# Patient Record
Sex: FEMALE | Race: WHITE | Employment: FULL TIME | URBAN - METROPOLITAN AREA
[De-identification: names, ages, dates, MRNs, and addresses within clinical notes are randomized per-mention and may not be internally consistent; named-entity substitution may affect disease eponyms.]

---

## 2017-08-15 ENCOUNTER — HOSPITAL ENCOUNTER (OUTPATIENT)
Dept: MAMMOGRAPHY | Age: 57
Discharge: HOME | End: 2017-08-15
Attending: SPECIALIST
Payer: COMMERCIAL

## 2017-08-15 DIAGNOSIS — Z12.31 VISIT FOR SCREENING MAMMOGRAM: ICD-10-CM

## 2017-08-15 PROCEDURE — 77067 SCR MAMMO BI INCL CAD: CPT

## 2018-09-19 ENCOUNTER — HOSPITAL ENCOUNTER (OUTPATIENT)
Dept: MAMMOGRAPHY | Age: 58
Discharge: HOME | End: 2018-09-19
Attending: SPECIALIST
Payer: COMMERCIAL

## 2018-09-19 DIAGNOSIS — Z12.39 SCREENING BREAST EXAMINATION: ICD-10-CM

## 2018-09-19 PROCEDURE — 77067 SCR MAMMO BI INCL CAD: CPT

## 2019-09-20 ENCOUNTER — HOSPITAL ENCOUNTER (OUTPATIENT)
Dept: MAMMOGRAPHY | Age: 59
Discharge: HOME | End: 2019-09-20
Attending: SPECIALIST
Payer: COMMERCIAL

## 2019-09-20 DIAGNOSIS — Z12.39 BREAST SCREENING, UNSPECIFIED: ICD-10-CM

## 2019-09-20 PROCEDURE — 77067 SCR MAMMO BI INCL CAD: CPT

## 2020-07-02 ENCOUNTER — HOSPITAL ENCOUNTER (OUTPATIENT)
Dept: PREADMISSION TESTING | Age: 60
Discharge: HOME | End: 2020-07-02
Attending: PLASTIC SURGERY
Payer: SELF-PAY

## 2020-07-02 VITALS
SYSTOLIC BLOOD PRESSURE: 137 MMHG | RESPIRATION RATE: 16 BRPM | DIASTOLIC BLOOD PRESSURE: 71 MMHG | HEART RATE: 60 BPM | TEMPERATURE: 98.3 F | OXYGEN SATURATION: 99 % | HEIGHT: 63 IN | WEIGHT: 148.59 LBS | BODY MASS INDEX: 26.33 KG/M2

## 2020-07-02 LAB
ANION GAP SERPL CALC-SCNC: 4 MMOL/L (ref 5–15)
BUN SERPL-MCNC: 8 MG/DL (ref 6–20)
BUN/CREAT SERPL: 10 (ref 12–20)
CALCIUM SERPL-MCNC: 8.3 MG/DL (ref 8.5–10.1)
CHLORIDE SERPL-SCNC: 108 MMOL/L (ref 97–108)
CO2 SERPL-SCNC: 28 MMOL/L (ref 21–32)
CREAT SERPL-MCNC: 0.78 MG/DL (ref 0.55–1.02)
ERYTHROCYTE [DISTWIDTH] IN BLOOD BY AUTOMATED COUNT: 12.7 % (ref 11.5–14.5)
GLUCOSE SERPL-MCNC: 94 MG/DL (ref 65–100)
HCT VFR BLD AUTO: 41 % (ref 35–47)
HGB BLD-MCNC: 13.2 G/DL (ref 11.5–16)
MCH RBC QN AUTO: 30.6 PG (ref 26–34)
MCHC RBC AUTO-ENTMCNC: 32.2 G/DL (ref 30–36.5)
MCV RBC AUTO: 95.1 FL (ref 80–99)
NRBC # BLD: 0 K/UL (ref 0–0.01)
NRBC BLD-RTO: 0 PER 100 WBC
PLATELET # BLD AUTO: 222 K/UL (ref 150–400)
PMV BLD AUTO: 12.3 FL (ref 8.9–12.9)
POTASSIUM SERPL-SCNC: 3.9 MMOL/L (ref 3.5–5.1)
RBC # BLD AUTO: 4.31 M/UL (ref 3.8–5.2)
SODIUM SERPL-SCNC: 140 MMOL/L (ref 136–145)
WBC # BLD AUTO: 8.9 K/UL (ref 3.6–11)

## 2020-07-02 PROCEDURE — 93005 ELECTROCARDIOGRAM TRACING: CPT

## 2020-07-02 PROCEDURE — 36415 COLL VENOUS BLD VENIPUNCTURE: CPT

## 2020-07-02 PROCEDURE — 80048 BASIC METABOLIC PNL TOTAL CA: CPT

## 2020-07-02 PROCEDURE — 85027 COMPLETE CBC AUTOMATED: CPT

## 2020-07-02 RX ORDER — ACETAMINOPHEN 500 MG
500 TABLET ORAL
COMMUNITY

## 2020-07-02 NOTE — PERIOP NOTES
[facility]  Preoperative Instructions        Surgery Date 07/10/2020   Time of Arrival 0545am  Contact# [phone]    1. On the day of your surgery, please report to the Surgical Services Registration Desk and sign in at your designated time. The Surgery Center is located to the right of the Emergency Room. 2. You must have someone with you to drive you home. You should not drive a car for 24 hours following surgery. Please make arrangements for a friend or family member to stay with you for the first 24 hours after your surgery. 3. Do not have anything to eat or drink (including water, gum, mints, coffee, juice) after midnight 7/9/2020 . ? This may not apply to medications prescribed by your physician. ?(Please note below the special instructions with medications to take the morning of your procedure.)    4. We recommend you do not drink any alcoholic beverages for 24 hours before and after your surgery. 5. Contact your surgeons office for instructions on the following medications: non-steroidal anti-inflammatory drugs (i.e. Advil, Aleve), vitamins, and supplements. (Some surgeons will want you to stop these medications prior to surgery and others may allow you to take them)  **If you are currently taking Plavix, Coumadin, Aspirin and/or other blood-thinning agents, contact your surgeon for instructions. ** Your surgeon will partner with the physician prescribing these medications to determine if it is safe to stop or if you need to continue taking. Please do not stop taking these medications without instructions from your surgeon    6. Wear comfortable clothes. Wear glasses instead of contacts. Do not bring any money or jewelry. Please bring picture ID, insurance card, and any prearranged co-payment or hospital payment. Do not wear make-up, particularly mascara the morning of your surgery. Do not wear nail polish, particularly if you are having foot /hand surgery.   Wear your hair loose or down, no ponytails, buns, isha pins or clips. All body piercings must be removed. Please shower with antibacterial soap for three consecutive days before and on the morning of surgery, but do not apply any lotions, powders or deodorants after the shower on the day of surgery. Please use a fresh towels after each shower. Please sleep in clean clothes and change bed linens the night before surgery. Please do not shave for 48 hours prior to surgery. Shaving of the face is acceptable. 7. You should understand that if you do not follow these instructions your surgery may be cancelled. If your physical condition changes (I.e. fever, cold or flu) please contact your surgeon as soon as possible. 8. It is important that you be on time. If a situation occurs where you may be late, please call [phone] (OR Holding Area). 9. If you have any questions and or problems, please call [phone] (Pre-admission Testing). 10. Your surgery time may be subject to change. You will receive a phone call the evening prior if your time changes. 11.  If having outpatient surgery, you must have someone to drive you here, stay with you during the duration of your stay, and to drive you home at time of discharge. Special Instructions: Follow all instructions given to you by your doctor. Covid Testing 7/6/2020 @1030am - we recommend you self quarantine from time of testing til day of surgery. TAKE ALL MEDICATIONS DAY OF SURGERY EXCEPT: none      I understand a pre-operative phone call will be made to verify my surgery time. In the event that I am not available, I give permission for a message to be left on my answering service and/or with another person?   yes         ___________________      __________   _________    (Signature of Patient)             (Witness)                (Date and Time)

## 2020-07-02 NOTE — PERIOP NOTES
PAT appointment with patient - all teaching and instructions reviewed with no further questions.     Covid Testing 7/6/2020 @ 1030am - recommend you quarantine from time of testing til DOS

## 2020-07-03 LAB
ATRIAL RATE: 62 BPM
CALCULATED P AXIS, ECG09: -18 DEGREES
CALCULATED R AXIS, ECG10: -11 DEGREES
CALCULATED T AXIS, ECG11: 32 DEGREES
DIAGNOSIS, 93000: NORMAL
P-R INTERVAL, ECG05: 134 MS
Q-T INTERVAL, ECG07: 434 MS
QRS DURATION, ECG06: 86 MS
QTC CALCULATION (BEZET), ECG08: 440 MS
VENTRICULAR RATE, ECG03: 62 BPM

## 2020-07-06 ENCOUNTER — HOSPITAL ENCOUNTER (OUTPATIENT)
Dept: PREADMISSION TESTING | Age: 60
Discharge: HOME | End: 2020-07-06
Payer: SELF-PAY

## 2020-07-06 PROCEDURE — 87635 SARS-COV-2 COVID-19 AMP PRB: CPT

## 2020-07-07 LAB
SARS-COV-2, COV2NT: NOT DETECTED
SOURCE, COVRS: NORMAL
SPECIMEN SOURCE, FCOV2M: NORMAL

## 2020-07-09 ENCOUNTER — ANESTHESIA EVENT (OUTPATIENT)
Dept: SURGERY | Age: 60
End: 2020-07-09
Payer: SELF-PAY

## 2020-07-09 NOTE — ANESTHESIA PREPROCEDURE EVALUATION
Relevant Problems   No relevant active problems       Anesthetic History   No history of anesthetic complications            Review of Systems / Medical History  Patient summary reviewed, nursing notes reviewed and pertinent labs reviewed    Pulmonary  Within defined limits                 Neuro/Psych   Within defined limits           Cardiovascular  Within defined limits                Exercise tolerance: >4 METS     GI/Hepatic/Renal         Renal disease: stones      Comments: IBS Endo/Other      Hypothyroidism       Other Findings              Physical Exam    Airway  Mallampati: II  TM Distance: 4 - 6 cm  Neck ROM: normal range of motion   Mouth opening: Normal     Cardiovascular  Regular rate and rhythm,  S1 and S2 normal,  no murmur, click, rub, or gallop  Rhythm: regular  Rate: normal         Dental    Dentition: Caps/crowns     Pulmonary  Breath sounds clear to auscultation               Abdominal  GI exam deferred       Other Findings            Anesthetic Plan    ASA: 2  Anesthesia type: general          Induction: Intravenous  Anesthetic plan and risks discussed with: Patient

## 2020-07-10 ENCOUNTER — ANESTHESIA (OUTPATIENT)
Dept: SURGERY | Age: 60
End: 2020-07-10
Payer: SELF-PAY

## 2020-07-10 ENCOUNTER — HOSPITAL ENCOUNTER (OUTPATIENT)
Age: 60
Setting detail: OUTPATIENT SURGERY
Discharge: HOME | End: 2020-07-10
Attending: PLASTIC SURGERY | Admitting: PLASTIC SURGERY
Payer: SELF-PAY

## 2020-07-10 VITALS
OXYGEN SATURATION: 94 % | HEIGHT: 63 IN | RESPIRATION RATE: 14 BRPM | DIASTOLIC BLOOD PRESSURE: 58 MMHG | BODY MASS INDEX: 26.17 KG/M2 | WEIGHT: 147.71 LBS | SYSTOLIC BLOOD PRESSURE: 115 MMHG | TEMPERATURE: 98 F | HEART RATE: 96 BPM

## 2020-07-10 PROCEDURE — 77030031139 HC SUT VCRL2 J&J -A: Performed by: PLASTIC SURGERY

## 2020-07-10 PROCEDURE — 77030008684 HC TU ET CUF COVD -B: Performed by: ANESTHESIOLOGY

## 2020-07-10 PROCEDURE — 77030018836 HC SOL IRR NACL ICUM -A: Performed by: PLASTIC SURGERY

## 2020-07-10 PROCEDURE — 77030013674 HC FIL EXPND TISS ALGN -A: Performed by: PLASTIC SURGERY

## 2020-07-10 PROCEDURE — 74011000250 HC RX REV CODE- 250

## 2020-07-10 PROCEDURE — 76060000043 HC ANESTHESIA 6 TO 6.5 HR: Performed by: PLASTIC SURGERY

## 2020-07-10 PROCEDURE — 76010000140 HC OR TIME 6 TO 6.5 HR: Performed by: PLASTIC SURGERY

## 2020-07-10 PROCEDURE — 77030011640 HC PAD GRND REM COVD -A: Performed by: PLASTIC SURGERY

## 2020-07-10 PROCEDURE — 76210000020 HC REC RM PH II FIRST 0.5 HR: Performed by: PLASTIC SURGERY

## 2020-07-10 PROCEDURE — 77030008538 HC TBNG LIPO SUC WELL -B: Performed by: PLASTIC SURGERY

## 2020-07-10 PROCEDURE — 74011250636 HC RX REV CODE- 250/636: Performed by: ANESTHESIOLOGY

## 2020-07-10 PROCEDURE — 77030026438 HC STYL ET INTUB CARD -A: Performed by: ANESTHESIOLOGY

## 2020-07-10 PROCEDURE — 76210000006 HC OR PH I REC 0.5 TO 1 HR: Performed by: PLASTIC SURGERY

## 2020-07-10 PROCEDURE — 77030020061 HC IV BLD WRMR ADMIN SET 3M -B: Performed by: ANESTHESIOLOGY

## 2020-07-10 PROCEDURE — 77030008534 HC TBNG LIPOSUC BYRO -B: Performed by: PLASTIC SURGERY

## 2020-07-10 PROCEDURE — 74011250636 HC RX REV CODE- 250/636: Performed by: PLASTIC SURGERY

## 2020-07-10 PROCEDURE — 74011000250 HC RX REV CODE- 250: Performed by: PLASTIC SURGERY

## 2020-07-10 PROCEDURE — 77030013079 HC BLNKT BAIR HGGR 3M -A: Performed by: ANESTHESIOLOGY

## 2020-07-10 PROCEDURE — 77030038692 HC WND DEB SYS IRMX -B: Performed by: PLASTIC SURGERY

## 2020-07-10 PROCEDURE — 77030010507 HC ADH SKN DERMBND J&J -B: Performed by: PLASTIC SURGERY

## 2020-07-10 PROCEDURE — 77030011825 HC SUPP SURG PSTOP S2SG -B: Performed by: PLASTIC SURGERY

## 2020-07-10 PROCEDURE — 77030008459 HC STPLR SKN COOP -B: Performed by: PLASTIC SURGERY

## 2020-07-10 PROCEDURE — 77030012890

## 2020-07-10 PROCEDURE — 74011250636 HC RX REV CODE- 250/636

## 2020-07-10 PROCEDURE — 77030013629 HC ELECTRD NDL STRY -B: Performed by: PLASTIC SURGERY

## 2020-07-10 PROCEDURE — 77030020255 HC SOL INJ LR 1000ML BG: Performed by: PLASTIC SURGERY

## 2020-07-10 PROCEDURE — 77030005304: Performed by: PLASTIC SURGERY

## 2020-07-10 PROCEDURE — 74011250637 HC RX REV CODE- 250/637

## 2020-07-10 PROCEDURE — 77030002966 HC SUT PDS J&J -A: Performed by: PLASTIC SURGERY

## 2020-07-10 PROCEDURE — 74011000272 HC RX REV CODE- 272: Performed by: PLASTIC SURGERY

## 2020-07-10 PROCEDURE — 77030002974 HC SUT PLN J&J -A: Performed by: PLASTIC SURGERY

## 2020-07-10 PROCEDURE — P9045 ALBUMIN (HUMAN), 5%, 250 ML: HCPCS

## 2020-07-10 PROCEDURE — 77030002986 HC SUT PROL J&J -A: Performed by: PLASTIC SURGERY

## 2020-07-10 PROCEDURE — 77030040922 HC BLNKT HYPOTHRM STRY -A

## 2020-07-10 PROCEDURE — 77030002933 HC SUT MCRYL J&J -A: Performed by: PLASTIC SURGERY

## 2020-07-10 PROCEDURE — 77030008462 HC STPLR SKN PROX J&J -A: Performed by: PLASTIC SURGERY

## 2020-07-10 PROCEDURE — 74011250637 HC RX REV CODE- 250/637: Performed by: PLASTIC SURGERY

## 2020-07-10 DEVICE — SMOOTH ROUND HIGH PROFILE GEL-FILLED BREAST IMPLANT, 800CC  SMOOTH ROUND SILICONE
Type: IMPLANTABLE DEVICE | Site: BREAST | Status: FUNCTIONAL
Brand: MENTOR MEMORYGEL BREAST IMPLANT

## 2020-07-10 RX ORDER — SODIUM CHLORIDE 0.9 % (FLUSH) 0.9 %
5-40 SYRINGE (ML) INJECTION EVERY 8 HOURS
Status: CANCELLED | OUTPATIENT
Start: 2020-07-10

## 2020-07-10 RX ORDER — SODIUM CHLORIDE, SODIUM LACTATE, POTASSIUM CHLORIDE, CALCIUM CHLORIDE 600; 310; 30; 20 MG/100ML; MG/100ML; MG/100ML; MG/100ML
75 INJECTION, SOLUTION INTRAVENOUS CONTINUOUS
Status: CANCELLED | OUTPATIENT
Start: 2020-07-10 | End: 2020-07-11

## 2020-07-10 RX ORDER — FENTANYL CITRATE 50 UG/ML
INJECTION, SOLUTION INTRAMUSCULAR; INTRAVENOUS AS NEEDED
Status: DISCONTINUED | OUTPATIENT
Start: 2020-07-10 | End: 2020-07-10 | Stop reason: HOSPADM

## 2020-07-10 RX ORDER — GLYCOPYRROLATE 0.2 MG/ML
INJECTION INTRAMUSCULAR; INTRAVENOUS AS NEEDED
Status: DISCONTINUED | OUTPATIENT
Start: 2020-07-10 | End: 2020-07-10 | Stop reason: HOSPADM

## 2020-07-10 RX ORDER — FENTANYL CITRATE 50 UG/ML
25 INJECTION, SOLUTION INTRAMUSCULAR; INTRAVENOUS
Status: DISCONTINUED | OUTPATIENT
Start: 2020-07-10 | End: 2020-07-10 | Stop reason: HOSPADM

## 2020-07-10 RX ORDER — OXYCODONE AND ACETAMINOPHEN 5; 325 MG/1; MG/1
1 TABLET ORAL AS NEEDED
Status: DISCONTINUED | OUTPATIENT
Start: 2020-07-10 | End: 2020-07-10 | Stop reason: HOSPADM

## 2020-07-10 RX ORDER — MIDAZOLAM HYDROCHLORIDE 1 MG/ML
0.5 INJECTION, SOLUTION INTRAMUSCULAR; INTRAVENOUS
Status: DISCONTINUED | OUTPATIENT
Start: 2020-07-10 | End: 2020-07-10 | Stop reason: HOSPADM

## 2020-07-10 RX ORDER — ONDANSETRON 2 MG/ML
4 INJECTION INTRAMUSCULAR; INTRAVENOUS AS NEEDED
Status: DISCONTINUED | OUTPATIENT
Start: 2020-07-10 | End: 2020-07-10 | Stop reason: HOSPADM

## 2020-07-10 RX ORDER — SODIUM CHLORIDE 0.9 % (FLUSH) 0.9 %
5-40 SYRINGE (ML) INJECTION AS NEEDED
Status: CANCELLED | OUTPATIENT
Start: 2020-07-10

## 2020-07-10 RX ORDER — PHENYLEPHRINE HCL IN 0.9% NACL 0.4MG/10ML
SYRINGE (ML) INTRAVENOUS AS NEEDED
Status: DISCONTINUED | OUTPATIENT
Start: 2020-07-10 | End: 2020-07-10 | Stop reason: HOSPADM

## 2020-07-10 RX ORDER — LIDOCAINE HYDROCHLORIDE 20 MG/ML
INJECTION, SOLUTION EPIDURAL; INFILTRATION; INTRACAUDAL; PERINEURAL AS NEEDED
Status: DISCONTINUED | OUTPATIENT
Start: 2020-07-10 | End: 2020-07-10 | Stop reason: HOSPADM

## 2020-07-10 RX ORDER — OXYCODONE AND ACETAMINOPHEN 5; 325 MG/1; MG/1
TABLET ORAL
Status: COMPLETED
Start: 2020-07-10 | End: 2020-07-10

## 2020-07-10 RX ORDER — ROCURONIUM BROMIDE 10 MG/ML
INJECTION, SOLUTION INTRAVENOUS AS NEEDED
Status: DISCONTINUED | OUTPATIENT
Start: 2020-07-10 | End: 2020-07-10 | Stop reason: HOSPADM

## 2020-07-10 RX ORDER — ONDANSETRON 2 MG/ML
INJECTION INTRAMUSCULAR; INTRAVENOUS AS NEEDED
Status: DISCONTINUED | OUTPATIENT
Start: 2020-07-10 | End: 2020-07-10 | Stop reason: HOSPADM

## 2020-07-10 RX ORDER — SODIUM CHLORIDE 9 MG/ML
50 INJECTION, SOLUTION INTRAVENOUS CONTINUOUS
Status: CANCELLED | OUTPATIENT
Start: 2020-07-10 | End: 2020-07-11

## 2020-07-10 RX ORDER — SODIUM CHLORIDE, SODIUM LACTATE, POTASSIUM CHLORIDE, CALCIUM CHLORIDE 600; 310; 30; 20 MG/100ML; MG/100ML; MG/100ML; MG/100ML
25 INJECTION, SOLUTION INTRAVENOUS ONCE
Status: COMPLETED | OUTPATIENT
Start: 2020-07-10 | End: 2020-07-10

## 2020-07-10 RX ORDER — LIDOCAINE HYDROCHLORIDE 10 MG/ML
0.1 INJECTION, SOLUTION EPIDURAL; INFILTRATION; INTRACAUDAL; PERINEURAL AS NEEDED
Status: CANCELLED | OUTPATIENT
Start: 2020-07-10

## 2020-07-10 RX ORDER — SODIUM CHLORIDE, SODIUM LACTATE, POTASSIUM CHLORIDE, CALCIUM CHLORIDE 600; 310; 30; 20 MG/100ML; MG/100ML; MG/100ML; MG/100ML
75 INJECTION, SOLUTION INTRAVENOUS CONTINUOUS
Status: DISCONTINUED | OUTPATIENT
Start: 2020-07-10 | End: 2020-07-10 | Stop reason: HOSPADM

## 2020-07-10 RX ORDER — DIPHENHYDRAMINE HYDROCHLORIDE 50 MG/ML
12.5 INJECTION, SOLUTION INTRAMUSCULAR; INTRAVENOUS AS NEEDED
Status: DISCONTINUED | OUTPATIENT
Start: 2020-07-10 | End: 2020-07-10 | Stop reason: HOSPADM

## 2020-07-10 RX ORDER — SODIUM CHLORIDE, SODIUM LACTATE, POTASSIUM CHLORIDE, CALCIUM CHLORIDE 600; 310; 30; 20 MG/100ML; MG/100ML; MG/100ML; MG/100ML
INJECTION, SOLUTION INTRAVENOUS
Status: DISCONTINUED | OUTPATIENT
Start: 2020-07-10 | End: 2020-07-10 | Stop reason: HOSPADM

## 2020-07-10 RX ORDER — SODIUM CHLORIDE 0.9 % (FLUSH) 0.9 %
5-40 SYRINGE (ML) INJECTION EVERY 8 HOURS
Status: DISCONTINUED | OUTPATIENT
Start: 2020-07-10 | End: 2020-07-10 | Stop reason: HOSPADM

## 2020-07-10 RX ORDER — MORPHINE SULFATE 10 MG/ML
2 INJECTION, SOLUTION INTRAMUSCULAR; INTRAVENOUS
Status: DISCONTINUED | OUTPATIENT
Start: 2020-07-10 | End: 2020-07-10 | Stop reason: HOSPADM

## 2020-07-10 RX ORDER — SODIUM CHLORIDE 9 MG/ML
50 INJECTION, SOLUTION INTRAVENOUS CONTINUOUS
Status: DISCONTINUED | OUTPATIENT
Start: 2020-07-10 | End: 2020-07-10 | Stop reason: HOSPADM

## 2020-07-10 RX ORDER — HYDROMORPHONE HYDROCHLORIDE 1 MG/ML
0.2 INJECTION, SOLUTION INTRAMUSCULAR; INTRAVENOUS; SUBCUTANEOUS
Status: DISCONTINUED | OUTPATIENT
Start: 2020-07-10 | End: 2020-07-10 | Stop reason: HOSPADM

## 2020-07-10 RX ORDER — NEOSTIGMINE METHYLSULFATE 1 MG/ML
INJECTION, SOLUTION INTRAVENOUS AS NEEDED
Status: DISCONTINUED | OUTPATIENT
Start: 2020-07-10 | End: 2020-07-10 | Stop reason: HOSPADM

## 2020-07-10 RX ORDER — PROPOFOL 10 MG/ML
INJECTION, EMULSION INTRAVENOUS AS NEEDED
Status: DISCONTINUED | OUTPATIENT
Start: 2020-07-10 | End: 2020-07-10 | Stop reason: HOSPADM

## 2020-07-10 RX ORDER — EPHEDRINE SULFATE/0.9% NACL/PF 50 MG/5 ML
SYRINGE (ML) INTRAVENOUS AS NEEDED
Status: DISCONTINUED | OUTPATIENT
Start: 2020-07-10 | End: 2020-07-10 | Stop reason: HOSPADM

## 2020-07-10 RX ORDER — MIDAZOLAM HYDROCHLORIDE 1 MG/ML
1 INJECTION, SOLUTION INTRAMUSCULAR; INTRAVENOUS AS NEEDED
Status: CANCELLED | OUTPATIENT
Start: 2020-07-10

## 2020-07-10 RX ORDER — DIPHENHYDRAMINE HYDROCHLORIDE 50 MG/ML
INJECTION, SOLUTION INTRAMUSCULAR; INTRAVENOUS AS NEEDED
Status: DISCONTINUED | OUTPATIENT
Start: 2020-07-10 | End: 2020-07-10 | Stop reason: HOSPADM

## 2020-07-10 RX ORDER — SUCCINYLCHOLINE CHLORIDE 20 MG/ML
INJECTION INTRAMUSCULAR; INTRAVENOUS AS NEEDED
Status: DISCONTINUED | OUTPATIENT
Start: 2020-07-10 | End: 2020-07-10 | Stop reason: HOSPADM

## 2020-07-10 RX ORDER — MIDAZOLAM HYDROCHLORIDE 1 MG/ML
INJECTION, SOLUTION INTRAMUSCULAR; INTRAVENOUS AS NEEDED
Status: DISCONTINUED | OUTPATIENT
Start: 2020-07-10 | End: 2020-07-10 | Stop reason: HOSPADM

## 2020-07-10 RX ORDER — DEXAMETHASONE SODIUM PHOSPHATE 4 MG/ML
INJECTION, SOLUTION INTRA-ARTICULAR; INTRALESIONAL; INTRAMUSCULAR; INTRAVENOUS; SOFT TISSUE AS NEEDED
Status: DISCONTINUED | OUTPATIENT
Start: 2020-07-10 | End: 2020-07-10 | Stop reason: HOSPADM

## 2020-07-10 RX ORDER — ALBUMIN HUMAN 50 G/1000ML
SOLUTION INTRAVENOUS AS NEEDED
Status: DISCONTINUED | OUTPATIENT
Start: 2020-07-10 | End: 2020-07-10 | Stop reason: HOSPADM

## 2020-07-10 RX ORDER — SODIUM CHLORIDE 0.9 % (FLUSH) 0.9 %
5-40 SYRINGE (ML) INJECTION AS NEEDED
Status: DISCONTINUED | OUTPATIENT
Start: 2020-07-10 | End: 2020-07-10 | Stop reason: HOSPADM

## 2020-07-10 RX ORDER — FENTANYL CITRATE 50 UG/ML
50 INJECTION, SOLUTION INTRAMUSCULAR; INTRAVENOUS AS NEEDED
Status: CANCELLED | OUTPATIENT
Start: 2020-07-10

## 2020-07-10 RX ORDER — HYDROMORPHONE HYDROCHLORIDE 2 MG/ML
INJECTION, SOLUTION INTRAMUSCULAR; INTRAVENOUS; SUBCUTANEOUS AS NEEDED
Status: DISCONTINUED | OUTPATIENT
Start: 2020-07-10 | End: 2020-07-10 | Stop reason: HOSPADM

## 2020-07-10 RX ADMIN — HYDROMORPHONE HYDROCHLORIDE 0.4 MG: 2 INJECTION, SOLUTION INTRAMUSCULAR; INTRAVENOUS; SUBCUTANEOUS at 12:13

## 2020-07-10 RX ADMIN — SODIUM CHLORIDE, POTASSIUM CHLORIDE, SODIUM LACTATE AND CALCIUM CHLORIDE: 600; 310; 30; 20 INJECTION, SOLUTION INTRAVENOUS at 09:11

## 2020-07-10 RX ADMIN — Medication 10 MG: at 09:30

## 2020-07-10 RX ADMIN — DEXAMETHASONE SODIUM PHOSPHATE 8 MG: 4 INJECTION, SOLUTION INTRAMUSCULAR; INTRAVENOUS at 07:57

## 2020-07-10 RX ADMIN — ONDANSETRON HYDROCHLORIDE 4 MG: 2 INJECTION, SOLUTION INTRAMUSCULAR; INTRAVENOUS at 10:04

## 2020-07-10 RX ADMIN — SODIUM CHLORIDE, SODIUM LACTATE, POTASSIUM CHLORIDE, AND CALCIUM CHLORIDE 25 ML/HR: 600; 310; 30; 20 INJECTION, SOLUTION INTRAVENOUS at 06:39

## 2020-07-10 RX ADMIN — SUCCINYLCHOLINE CHLORIDE 140 MG: 20 INJECTION, SOLUTION INTRAMUSCULAR; INTRAVENOUS at 07:35

## 2020-07-10 RX ADMIN — Medication 10 MG: at 09:00

## 2020-07-10 RX ADMIN — Medication 20 MCG: at 12:30

## 2020-07-10 RX ADMIN — GLYCOPYRROLATE 0.4 MG: 0.2 INJECTION, SOLUTION INTRAMUSCULAR; INTRAVENOUS at 13:20

## 2020-07-10 RX ADMIN — HYDROMORPHONE HYDROCHLORIDE 0.4 MG: 2 INJECTION, SOLUTION INTRAMUSCULAR; INTRAVENOUS; SUBCUTANEOUS at 09:04

## 2020-07-10 RX ADMIN — WATER 2 G: 1 INJECTION INTRAMUSCULAR; INTRAVENOUS; SUBCUTANEOUS at 11:23

## 2020-07-10 RX ADMIN — PROPOFOL 200 MG: 10 INJECTION, EMULSION INTRAVENOUS at 07:35

## 2020-07-10 RX ADMIN — Medication 10 MG: at 10:44

## 2020-07-10 RX ADMIN — ROCURONIUM BROMIDE 40 MG: 10 INJECTION INTRAVENOUS at 07:43

## 2020-07-10 RX ADMIN — MIDAZOLAM HYDROCHLORIDE 2 MG: 1 INJECTION, SOLUTION INTRAMUSCULAR; INTRAVENOUS at 07:34

## 2020-07-10 RX ADMIN — FENTANYL CITRATE 100 MCG: 50 INJECTION, SOLUTION INTRAMUSCULAR; INTRAVENOUS at 07:35

## 2020-07-10 RX ADMIN — SODIUM CHLORIDE, SODIUM LACTATE, POTASSIUM CHLORIDE, AND CALCIUM CHLORIDE 75 ML/HR: 600; 310; 30; 20 INJECTION, SOLUTION INTRAVENOUS at 14:12

## 2020-07-10 RX ADMIN — Medication 10 MG: at 09:10

## 2020-07-10 RX ADMIN — DIPHENHYDRAMINE HYDROCHLORIDE 12.5 MG: 50 INJECTION, SOLUTION INTRAMUSCULAR; INTRAVENOUS at 09:40

## 2020-07-10 RX ADMIN — LIDOCAINE HYDROCHLORIDE 40 MG: 20 INJECTION, SOLUTION EPIDURAL; INFILTRATION; INTRACAUDAL; PERINEURAL at 07:35

## 2020-07-10 RX ADMIN — WATER 2 G: 1 INJECTION INTRAMUSCULAR; INTRAVENOUS; SUBCUTANEOUS at 07:24

## 2020-07-10 RX ADMIN — ROCURONIUM BROMIDE 10 MG: 10 INJECTION INTRAVENOUS at 07:35

## 2020-07-10 RX ADMIN — SODIUM CHLORIDE, POTASSIUM CHLORIDE, SODIUM LACTATE AND CALCIUM CHLORIDE: 600; 310; 30; 20 INJECTION, SOLUTION INTRAVENOUS at 07:23

## 2020-07-10 RX ADMIN — ALBUMIN (HUMAN) 250 ML: 2.5 SOLUTION INTRAVENOUS at 07:46

## 2020-07-10 RX ADMIN — OXYCODONE HYDROCHLORIDE AND ACETAMINOPHEN 1 TABLET: 5; 325 TABLET ORAL at 14:30

## 2020-07-10 RX ADMIN — Medication 3 AMPULE: at 06:32

## 2020-07-10 RX ADMIN — HYDROMORPHONE HYDROCHLORIDE 0.4 MG: 2 INJECTION, SOLUTION INTRAMUSCULAR; INTRAVENOUS; SUBCUTANEOUS at 10:27

## 2020-07-10 RX ADMIN — OXYCODONE AND ACETAMINOPHEN 1 TABLET: 5; 325 TABLET ORAL at 14:30

## 2020-07-10 RX ADMIN — Medication 3 MG: at 13:20

## 2020-07-10 NOTE — BRIEF OP NOTE
Brief Postoperative Note    Patient: Lilibeth Ryan  YOB: 1960  MRN: [id number]    Date of Procedure: 7/10/2020     Pre-Op Diagnosis: COSMETIC    Post-Op Diagnosis: Same as preoperative diagnosis. Procedure(s):  FACELIFT WITH FAT GRAFTING (8ml total)  BILATERAL BREAST MASTOPEXY WITH IMPLANT EXCHANGE (750ml HP gel on left, 800ml HP gel on right)  LIPOSUCTION BILATERAL LOWER EXTREMITIES (600ml total)    Surgeon(s):  Omero Perkins MD    Surgical Assistant: None    Anesthesia: General     Estimated Blood Loss (mL): less than 574     Complications: None    Specimens: * No specimens in log *     Implants:   Implant Name Type Inv.  Item Serial No.  Lot No. LRB No. Used Action   IMPL BRST RND HP GEL 750ML --  - [id number]  IMPL BRST RND HP GEL 750ML --  [id number] [place] [id number] Left 1 Implanted   IMPL BRST RND SMTH 800ML --  - [id number]  IMPL BRST RND SMTH 800ML --  [id number] [employer] [id number] Right 1 Implanted       Drains:   Drain 8 fr feeding tube used as drain Left Face (Active)       Drain 8 fr feeding tube as drain 07/10/20 Right Face (Active)       Findings: see note    Electronically Signed by Lulu Koroma MD on 7/10/2020 at 1:13 PM

## 2020-07-10 NOTE — PERIOP NOTES
Irrisept Wound Debridement and Cleansing System  Ref: Rashid Garcia [id number] LOT: [id number] Expiration Date: 03/31/2022    0820 Spoke with patients  Juan Tate, update given, will call again in an hour. 0920 Hourly update given to Juan Tate. 1036 Update given to Juan Tate. 1138 Update given to Juan Tate. 1237 Update given to Juan Tate.

## 2020-07-10 NOTE — PERIOP NOTES
Handoff Report from Operating Room to PACU    Report received from Rosa Kraft CRNA and Roe Pritchard RN regarding Ruth Redd. Surgeon(s):  Laura Erazo MD  And Procedure(s) (LRB):  FACELIFT WITH FAT GRAFTING (N/A)  BILATERAL BREAST MASTOPEXY WITH IMPLANT EXCHANGE (Bilateral)  LIPOSUCTION BILATERAL LOWER EXTREMITIES (Bilateral)  confirmed   with allergies, drains and dressings discussed. Anesthesia type, drugs, patient history, complications, estimated blood loss, vital signs, intake and output, and last pain medication, lines, reversal medications and temperature were reviewed. Discharge instructions reviewed with patient and family. Allowed adequate time to ask questions, all questions answered. Printed copy of AVS given to patient. All belongings gathered, IV and tele discontinued. No DME with patient. Transported via wheelchair by RN to [facility] and into care of family.

## 2020-07-10 NOTE — ANESTHESIA POSTPROCEDURE EVALUATION
Procedure(s):  FACELIFT WITH FAT GRAFTING  BILATERAL BREAST MASTOPEXY WITH IMPLANT EXCHANGE  LIPOSUCTION BILATERAL LOWER EXTREMITIES. general    Anesthesia Post Evaluation        Patient location during evaluation: PACU  Note status: Adequate. Level of consciousness: responsive to verbal stimuli and sleepy but conscious  Pain management: satisfactory to patient  Airway patency: patent  Anesthetic complications: no  Cardiovascular status: acceptable  Respiratory status: acceptable  Hydration status: acceptable  Comments: +Post-Anesthesia Evaluation and Assessment    Patient: Ravinder Morocho MRN: [id number]  SSN: [id number]   YOB: 1960  Age: 61 y.o. Sex: female      Cardiovascular Function/Vital Signs    /56   Pulse 98   Temp 36.7 °C (98.1 °F)   Resp 13   Ht 5' 3\" (1.6 m)   Wt 67 kg (147 lb 11.3 oz)   SpO2 94%   BMI 26.17 kg/m²     Patient is status post Procedure(s):  FACELIFT WITH FAT GRAFTING  BILATERAL BREAST MASTOPEXY WITH IMPLANT EXCHANGE  LIPOSUCTION BILATERAL LOWER EXTREMITIES. Nausea/Vomiting: Controlled. Postoperative hydration reviewed and adequate. Pain:  Pain Scale 1: Numeric (0 - 10) (07/10/20 1355)  Pain Intensity 1: 0 (07/10/20 1355)   Managed. Neurological Status:   Neuro (WDL): Exceptions to WDL (07/10/20 1342)   At baseline. Mental Status and Level of Consciousness: Arousable. Pulmonary Status:   O2 Device: Room air (07/10/20 1355)   Adequate oxygenation and airway patent. Complications related to anesthesia: None    Post-anesthesia assessment completed. No concerns. Signed By: Brandon Roper MD    7/10/2020  Post anesthesia nausea and vomiting:  controlled      INITIAL Post-op Vital signs:   Vitals Value Taken Time   /56 7/10/2020  2:00 PM   Temp 36.7 °C (98.1 °F) 7/10/2020  1:42 PM   Pulse 98 7/10/2020  2:03 PM   Resp 16 7/10/2020  2:03 PM   SpO2 93 % 7/10/2020  2:03 PM   Vitals shown include unvalidated device data.

## 2020-07-10 NOTE — DISCHARGE INSTRUCTIONS
Keep head elevated as much as possible to reduce swelling. May remove all dressings in 24 -48hrs and shower and wash hair and get all incisions wet. Wear sports bra unless showering. Wear binder or some compression on thighs as much as tolerated. No heavy lifting or vigorous activity. DISCHARGE SUMMARY from Nurse    PATIENT INSTRUCTIONS:    After general anesthesia or intravenous sedation, for 24 hours or while taking prescription Narcotics:  · Limit your activities  · Do not drive and operate hazardous machinery  · Do not make important personal or business decisions  · Do  not drink alcoholic beverages  · If you have not urinated within 8 hours after discharge, please contact your surgeon on call. Report the following to your surgeon:  · Excessive pain, swelling, redness or odor of or around the surgical area  · Temperature over 100.5  · Nausea and vomiting lasting longer than 4 hours or if unable to take medications  · Any signs of decreased circulation or nerve impairment to extremity: change in color, persistent  numbness, tingling, coldness or increase pain  · Any questions    What to do at Home:  Recommended activity: See surgical instructions. If you experience any of the following symptoms listed above, please follow up with Dr. Opal Christian. *  Please give a list of your current medications to your Primary Care Provider. *  Please update this list whenever your medications are discontinued, doses are      changed, or new medications (including over-the-counter products) are added. *  Please carry medication information at all times in case of emergency situations. These are general instructions for a healthy lifestyle:    No smoking/ No tobacco products/ Avoid exposure to second hand smoke  Surgeon General's Warning:  Quitting smoking now greatly reduces serious risk to your health.     Obesity, smoking, and sedentary lifestyle greatly increases your risk for illness    A healthy diet, regular physical exercise & weight monitoring are important for maintaining a healthy lifestyle    You may be retaining fluid if you have a history of heart failure or if you experience any of the following symptoms:  Weight gain of 3 pounds or more overnight or 5 pounds in a week, increased swelling in our hands or feet or shortness of breath while lying flat in bed. Please call your doctor as soon as you notice any of these symptoms; do not wait until your next office visit. The discharge information has been reviewed with the {PATIENT PARENT GUARDIAN:19802}. The {PATIENT PARENT GUARDIAN:12379} verbalized understanding. Discharge medications reviewed with the {Dishcarge meds reviewed [facility]:21123} and appropriate educational materials and side effects teaching were provided.   ___________________________________________________________________________________________________________________________________

## 2020-07-11 NOTE — OP NOTES
[address]  OPERATIVE REPORT    Name:  Leila Cortez  MR#:  [id number]  :  1960  ACCOUNT #:  [de-identified]  DATE OF SERVICE:  07/10/2020    PREOPERATIVE DIAGNOSES:  1. Aging face. 2.  Bilateral breast ptosis. 3.  Lipodystrophy of bilateral thighs. POSTOPERATIVE DIAGNOSES:  1. Aging face. 2.  Bilateral breast ptosis. 3.  Lipodystrophy of bilateral thighs. PROCEDURE PERFORMED:  1. Short-scar minimal access cranial suspension facelift with fat grafting (8 mL). 2.  Bilateral mastopexy with exchange of breast implants ([place] 750 high-profile smooth round gel on left and 800 mL smooth round high profile gel on right). 3.  Suction-assisted lipectomy of bilateral lateral thighs (600 mL total). SURGEON:  Sher Maynard MD    ASSISTANT:  Lorena Aguirre. ANESTHESIA:  General endotracheal.    COMPLICATIONS:  None. SPECIMENS REMOVED:  None. IMPLANTS:  As above. ESTIMATED BLOOD LOSS:  Approximately 50 mL. INDICATIONS:  This 70-year-old female presented with a remote history of bilateral breast augmentations. Her breasts had fallen off the implants. She also desired to be larger. Exchange to a larger implants with mastopexy was indicated. She also complained of an aging face, particularly gelling in deep nasolabial and marionette lines. She was felt to be an acceptable candidate for short-scar facelifting with fat grafting. Finally, she had a large fat deposits on bilateral lateral thighs, more on the left than the right. She was felt to be a good candidate for liposuction. Risks, benefits, and alternatives were discussed in detail preoperatively, and she agreed to proceed. PROCEDURE:  After informed consent was obtained, she was marked preoperatively in the holding area. She was taken to the operating room, where general anesthetic was given and tracheal tube placed. She was rolled into the prone position with all pressure points appropriately padded. The thighs and buttocks were prepped and draped. Small stab incisions were made and the lateral thighs were infiltrated with a wetting with approximately 500 mL of wetting solution consisting of 1 ampule of epinephrine, 30 mL 1% plain lidocaine per liter of warmed normal saline. After 10-minute time lapse, suction-assisted lipectomy was performed with a 4-mm cannula until contours were satisfactory. Approximately, 10 mL was transferred in a 10-mL syringe. When contours were satisfactory, incisions were closed in a single layer with 5-0 plain gut and Dermabond. She was then rolled into the supine position with all pressure points appropriately padded. The face was prepped and draped. The left side of the face and perioral area were infiltrated with a dilute local anesthetic consisting of 30 mL of 1% lidocaine with epinephrine and 150 mL plain saline. The harvested fat was transferred in a 1-mL syringes. Fat grafting was performed to the nasolabial and marionette lines along with the pre-jowl hollows. A total of 8 mL was deposited from the periosteum to the subdermis. Subcision of the nasolabial and marionette lines was also performed with an 18-gauge needle. Attention was then turned to left cheek, where a short-scar max type incision was made along the sideburn and down along the lateral cheek in a retrotragal fashion to the lobule. Her face with skin flap was sharply elevated with scissors to approximately 6 cm in front of the ear and down to the angle of the mandible. Meticulous hemostasis was obtained. A small window was made in the temple and the temporalis fascia was identified. Two pursestring sutures were then placed using running 0-PDS. The first suture extended down to the platysma, where small bites of the platysma were taken for neck contouring. Bites of suture were less than 2-3 mm deep to avoid facial nerve injury.   A second loop extended more anteriorly for perioral and cheek contouring. The sutures were pulled tightly and anchored at the temple. The facelift flap was then retracted in a superior direction and trimmed appropriately. Final closure was performed in layers with 4-0 Vicryl and 5-0 Prolene over a pediatric feeding tube as a drain. Attention was then turned to the right side, where a similar procedure was performed, again utilizing a short-scar face lift incision, elevating a facelift flap in the subcutaneous plane, achieving hemostasis, performing two pursestring performed minimal access cranial suspension left using two pursestring sutures, trimming the flap, and insetting it with 4-0 Vicryl and 5-0 Prolene. Symmetry was checked and found to be satisfactory. Attention was then turned to the chest, which was prepped and draped. A lower breast vertical incision was made on the right breast and the implant was removed. The capsule was found to be thin and healthy. Periprosthetic capsulotomy was performed along the entire upper pole. A saline sizer was inserted and filled with 800 mL. Attention was turned to left side, where a similar procedure was performed. Again, a lower pole vertical incision was made and the implant was removed. Again, the capsule was thin and healthy. A capsulotomy was performed on the upper pole on this side as well. A saline sizer was inserted and filled with 750 mL. The patient was then placed in the seated position. A vertical type mastopexy was tailor tacked with staples. Shape and symmetry were found to be satisfactory. She was returned to the supine position and the sizers were removed. The pockets were irrigated with dilute chlorhexidine solution followed by saline and triple antibiotics. An 800 mL high-profile gel implant was then placed on the right and a 750 mL on the left using the Higgins funnel and no-touch technique. The vertical mastopexy patterns were de-epithelialized.   The vertical pillars were approximated with 2-0 PDS, effectively coning the breast.  Final incisions were closed in layers with absorbable sutures. Dermabond and dry dressings were applied to the chest.  A binder was applied to the lateral thighs. Her hair was washed and antibiotic ointment was applied to the face. She tolerated the procedure well, was extubated in the room, was transferred to the Recovery room in stable condition.         MD ABDIRASHID [email or web address]  D:  07/10/2020 13:27  T:  07/10/2020 22:05  JOB #:  [id number]

## 2023-05-12 RX ORDER — ACETAMINOPHEN 500 MG
TABLET ORAL EVERY 6 HOURS PRN
COMMUNITY

## 2024-10-13 ENCOUNTER — APPOINTMENT (OUTPATIENT)
Facility: HOSPITAL | Age: 64
End: 2024-10-13
Payer: COMMERCIAL

## 2024-10-13 ENCOUNTER — HOSPITAL ENCOUNTER (EMERGENCY)
Facility: HOSPITAL | Age: 64
Discharge: HOME | End: 2024-10-13
Attending: STUDENT IN AN ORGANIZED HEALTH CARE EDUCATION/TRAINING PROGRAM
Payer: COMMERCIAL

## 2024-10-13 VITALS
TEMPERATURE: 97.7 F | OXYGEN SATURATION: 100 % | SYSTOLIC BLOOD PRESSURE: 132 MMHG | RESPIRATION RATE: 16 BRPM | DIASTOLIC BLOOD PRESSURE: 76 MMHG | BODY MASS INDEX: 22.5 KG/M2 | HEIGHT: 63 IN | WEIGHT: 126.98 LBS | HEART RATE: 68 BPM

## 2024-10-13 DIAGNOSIS — S05.02XA ABRASION OF LEFT CORNEA, INITIAL ENCOUNTER: Primary | ICD-10-CM

## 2024-10-13 DIAGNOSIS — N39.0 URINARY TRACT INFECTION WITHOUT HEMATURIA, SITE UNSPECIFIED: ICD-10-CM

## 2024-10-13 LAB
ALBUMIN SERPL-MCNC: 3.7 G/DL (ref 3.5–5)
ALBUMIN/GLOB SERPL: 1.2 (ref 1.1–2.2)
ALP SERPL-CCNC: 42 U/L (ref 45–117)
ALT SERPL-CCNC: 22 U/L (ref 12–78)
ANION GAP SERPL CALC-SCNC: 2 MMOL/L (ref 2–12)
APPEARANCE UR: CLEAR
AST SERPL-CCNC: 13 U/L (ref 15–37)
BACTERIA URNS QL MICRO: ABNORMAL /HPF
BASOPHILS # BLD: 0.1 K/UL (ref 0–0.1)
BASOPHILS NFR BLD: 1 % (ref 0–1)
BILIRUB SERPL-MCNC: 0.9 MG/DL (ref 0.2–1)
BILIRUB UR QL: NEGATIVE
BUN SERPL-MCNC: 10 MG/DL (ref 6–20)
BUN/CREAT SERPL: 13 (ref 12–20)
CALCIUM SERPL-MCNC: 8.5 MG/DL (ref 8.5–10.1)
CHLORIDE SERPL-SCNC: 109 MMOL/L (ref 97–108)
CO2 SERPL-SCNC: 31 MMOL/L (ref 21–32)
COLOR UR: ABNORMAL
CREAT SERPL-MCNC: 0.75 MG/DL (ref 0.55–1.02)
DIFFERENTIAL METHOD BLD: NORMAL
EOSINOPHIL # BLD: 0.1 K/UL (ref 0–0.4)
EOSINOPHIL NFR BLD: 1 % (ref 0–7)
EPITH CASTS URNS QL MICRO: ABNORMAL /LPF
ERYTHROCYTE [DISTWIDTH] IN BLOOD BY AUTOMATED COUNT: 12.9 % (ref 11.5–14.5)
GLOBULIN SER CALC-MCNC: 3.1 G/DL (ref 2–4)
GLUCOSE SERPL-MCNC: 102 MG/DL (ref 65–100)
GLUCOSE UR STRIP.AUTO-MCNC: NEGATIVE MG/DL
HCT VFR BLD AUTO: 44.5 % (ref 35–47)
HGB BLD-MCNC: 14.4 G/DL (ref 11.5–16)
HGB UR QL STRIP: NEGATIVE
IMM GRANULOCYTES # BLD AUTO: 0 K/UL (ref 0–0.04)
IMM GRANULOCYTES NFR BLD AUTO: 0 % (ref 0–0.5)
KETONES UR QL STRIP.AUTO: NEGATIVE MG/DL
LEUKOCYTE ESTERASE UR QL STRIP.AUTO: ABNORMAL
LYMPHOCYTES # BLD: 2.2 K/UL (ref 0.8–3.5)
LYMPHOCYTES NFR BLD: 28 % (ref 12–49)
MCH RBC QN AUTO: 30.8 PG (ref 26–34)
MCHC RBC AUTO-ENTMCNC: 32.4 G/DL (ref 30–36.5)
MCV RBC AUTO: 95.1 FL (ref 80–99)
MONOCYTES # BLD: 0.7 K/UL (ref 0–1)
MONOCYTES NFR BLD: 8 % (ref 5–13)
NEUTS SEG # BLD: 4.9 K/UL (ref 1.8–8)
NEUTS SEG NFR BLD: 62 % (ref 32–75)
NITRITE UR QL STRIP.AUTO: POSITIVE
NRBC # BLD: 0 K/UL (ref 0–0.01)
NRBC BLD-RTO: 0 PER 100 WBC
PH UR STRIP: 7 (ref 5–8)
PLATELET # BLD AUTO: 253 K/UL (ref 150–400)
PMV BLD AUTO: 11.7 FL (ref 8.9–12.9)
POTASSIUM SERPL-SCNC: 3.8 MMOL/L (ref 3.5–5.1)
PROT SERPL-MCNC: 6.8 G/DL (ref 6.4–8.2)
PROT UR STRIP-MCNC: NEGATIVE MG/DL
RBC # BLD AUTO: 4.68 M/UL (ref 3.8–5.2)
RBC #/AREA URNS HPF: ABNORMAL /HPF (ref 0–5)
SODIUM SERPL-SCNC: 142 MMOL/L (ref 136–145)
SP GR UR REFRACTOMETRY: <1.005
TROPONIN I SERPL HS-MCNC: 4 NG/L (ref 0–51)
URINE CULTURE IF INDICATED: ABNORMAL
UROBILINOGEN UR QL STRIP.AUTO: 1 EU/DL (ref 0.2–1)
WBC # BLD AUTO: 7.9 K/UL (ref 3.6–11)
WBC URNS QL MICRO: ABNORMAL /HPF (ref 0–4)

## 2024-10-13 PROCEDURE — 80053 COMPREHEN METABOLIC PANEL: CPT

## 2024-10-13 PROCEDURE — 6360000002 HC RX W HCPCS

## 2024-10-13 PROCEDURE — 96374 THER/PROPH/DIAG INJ IV PUSH: CPT

## 2024-10-13 PROCEDURE — 99285 EMERGENCY DEPT VISIT HI MDM: CPT

## 2024-10-13 PROCEDURE — 36415 COLL VENOUS BLD VENIPUNCTURE: CPT

## 2024-10-13 PROCEDURE — 71045 X-RAY EXAM CHEST 1 VIEW: CPT

## 2024-10-13 PROCEDURE — 6370000000 HC RX 637 (ALT 250 FOR IP)

## 2024-10-13 PROCEDURE — 84484 ASSAY OF TROPONIN QUANT: CPT

## 2024-10-13 PROCEDURE — 85025 COMPLETE CBC W/AUTO DIFF WBC: CPT

## 2024-10-13 PROCEDURE — 81001 URINALYSIS AUTO W/SCOPE: CPT

## 2024-10-13 PROCEDURE — 93005 ELECTROCARDIOGRAM TRACING: CPT | Performed by: STUDENT IN AN ORGANIZED HEALTH CARE EDUCATION/TRAINING PROGRAM

## 2024-10-13 RX ORDER — NITROFURANTOIN 25; 75 MG/1; MG/1
100 CAPSULE ORAL ONCE
Status: COMPLETED | OUTPATIENT
Start: 2024-10-13 | End: 2024-10-13

## 2024-10-13 RX ORDER — KETOROLAC TROMETHAMINE 30 MG/ML
15 INJECTION, SOLUTION INTRAMUSCULAR; INTRAVENOUS
Status: COMPLETED | OUTPATIENT
Start: 2024-10-13 | End: 2024-10-13

## 2024-10-13 RX ORDER — NITROFURANTOIN 25; 75 MG/1; MG/1
100 CAPSULE ORAL 2 TIMES DAILY
Qty: 10 CAPSULE | Refills: 0 | Status: SHIPPED | OUTPATIENT
Start: 2024-10-13 | End: 2024-10-18

## 2024-10-13 RX ORDER — CIPROFLOXACIN HYDROCHLORIDE 3.5 MG/ML
1 SOLUTION/ DROPS TOPICAL
Status: COMPLETED | OUTPATIENT
Start: 2024-10-13 | End: 2024-10-13

## 2024-10-13 RX ORDER — CIPROFLOXACIN HYDROCHLORIDE 3.5 MG/ML
1 SOLUTION/ DROPS TOPICAL 4 TIMES DAILY
Status: DISCONTINUED | OUTPATIENT
Start: 2024-10-13 | End: 2024-10-13 | Stop reason: HOSPADM

## 2024-10-13 RX ORDER — OXYCODONE HYDROCHLORIDE 5 MG/1
5 TABLET ORAL EVERY 6 HOURS PRN
Qty: 12 TABLET | Refills: 0 | Status: SHIPPED | OUTPATIENT
Start: 2024-10-13 | End: 2024-10-16

## 2024-10-13 RX ORDER — NITROFURANTOIN 25; 75 MG/1; MG/1
100 CAPSULE ORAL EVERY 12 HOURS SCHEDULED
Status: DISCONTINUED | OUTPATIENT
Start: 2024-10-13 | End: 2024-10-13 | Stop reason: HOSPADM

## 2024-10-13 RX ORDER — TETRACAINE HYDROCHLORIDE 5 MG/ML
1 SOLUTION OPHTHALMIC ONCE
Status: COMPLETED | OUTPATIENT
Start: 2024-10-13 | End: 2024-10-13

## 2024-10-13 RX ORDER — CARBOXYMETHYLCELLULOSE SODIUM, GLYCERIN 5; 9 MG/ML; MG/ML
1 SOLUTION/ DROPS OPHTHALMIC 4 TIMES DAILY
Qty: 30 EACH | Refills: 0 | Status: SHIPPED | OUTPATIENT
Start: 2024-10-13 | End: 2024-10-18

## 2024-10-13 RX ADMIN — NITROFURANTOIN MONOHYDRATE/MACROCRYSTALS 100 MG: 75; 25 CAPSULE ORAL at 13:16

## 2024-10-13 RX ADMIN — CIPROFLOXACIN 1 DROP: 3 SOLUTION OPHTHALMIC at 13:16

## 2024-10-13 RX ADMIN — FLUORESCEIN SODIUM 1 MG: 1 STRIP OPHTHALMIC at 12:17

## 2024-10-13 RX ADMIN — TETRACAINE HYDROCHLORIDE 1 DROP: 5 SOLUTION OPHTHALMIC at 12:17

## 2024-10-13 RX ADMIN — KETOROLAC TROMETHAMINE 15 MG: 30 INJECTION, SOLUTION INTRAMUSCULAR at 13:17

## 2024-10-13 ASSESSMENT — PAIN - FUNCTIONAL ASSESSMENT
PAIN_FUNCTIONAL_ASSESSMENT: ACTIVITIES ARE NOT PREVENTED
PAIN_FUNCTIONAL_ASSESSMENT: 0-10

## 2024-10-13 ASSESSMENT — PAIN DESCRIPTION - LOCATION: LOCATION: FLANK

## 2024-10-13 ASSESSMENT — PAIN SCALES - GENERAL
PAINLEVEL_OUTOF10: 6
PAINLEVEL_OUTOF10: 10

## 2024-10-13 ASSESSMENT — PAIN DESCRIPTION - DESCRIPTORS: DESCRIPTORS: ACHING

## 2024-10-13 ASSESSMENT — PAIN DESCRIPTION - ORIENTATION: ORIENTATION: LEFT;RIGHT

## 2024-10-14 LAB
EKG ATRIAL RATE: 76 BPM
EKG DIAGNOSIS: NORMAL
EKG P AXIS: 64 DEGREES
EKG P-R INTERVAL: 160 MS
EKG Q-T INTERVAL: 424 MS
EKG QRS DURATION: 86 MS
EKG QTC CALCULATION (BAZETT): 477 MS
EKG R AXIS: 29 DEGREES
EKG T AXIS: 53 DEGREES
EKG VENTRICULAR RATE: 76 BPM

## 2024-10-14 ASSESSMENT — ENCOUNTER SYMPTOMS
PHOTOPHOBIA: 1
DIARRHEA: 0
EYE PAIN: 1
VOMITING: 0
COUGH: 0
NAUSEA: 0
SHORTNESS OF BREATH: 0
ABDOMINAL PAIN: 0
SORE THROAT: 0

## 2024-10-14 NOTE — ED PROVIDER NOTES
to 3 days. Intended supply: 3 days. Take lowest dose possible to manage pain Max Daily Amount: 20 mg     Refresh Tears PF 0.5-0.9 % Soln  Generic drug: Carboxymethylcell-Glycerin PF  Apply 1 drop to eye 4 times daily for 5 days            ASK your doctor about these medications      acetaminophen 500 MG tablet  Commonly known as: TYLENOL               Where to Get Your Medications        These medications were sent to [facility]/pharmacy #[id number] - [place], [place] - [address] - P [phone] - F [phone]  [address], [facility] [address]      Hours: 24-hours Phone: [phone]   nitrofurantoin (macrocrystal-monohydrate) 100 MG capsule  oxyCODONE 5 MG immediate release tablet  Refresh Tears PF 0.5-0.9 % Soln           DISCONTINUED MEDICATIONS:  Discharge Medication List as of 10/13/2024  1:52 PM          I am the Primary Clinician of Record.   Akin Rosales MD (electronically signed)      (Please note that parts of this dictation were completed with voice recognition software. Quite often unanticipated grammatical, syntax, homophones, and other interpretive errors are inadvertently transcribed by the computer software. Please disregards these errors. Please excuse any errors that have escaped final proofreading.)       11:04 AM    Chief Complaint   Patient presents with    Chest Pain     Pt ambulatory into triage with a cc of chest pain, painful urination and burning and left eye pain; pt states her dog stepped in her eye last night; all symptoms started last night        The patient presents with:  several seconds of chest pain that has since resolved, reports it felt similar to previous panic attacks. Heart score:1. Also with eye pain after her dog scratched it while she was sleeping last night. Also reporting suprapubic pressure/dysuria consistnet with hx of UTIs.   UA c/w UTI, started on abx, CMP wnl, trop negative, CBC wnl. Crawford Laamp + for corneal abrasion. Plans to f/u with

## (undated) DEVICE — MARKER,SKIN,WI/RULER AND LABELS: Brand: MEDLINE

## (undated) DEVICE — Z CONVERTED USE 2271116 TUBING ASPIR 9 FT STRL ULTRA-LIMP

## (undated) DEVICE — SOLUTION LACTATED RINGERS INJECTION USP

## (undated) DEVICE — 3M(TM) MEDIPORE(TM) H SOFT CLOTH TAPE 2866: Brand: 3M™ MEDIPORE™

## (undated) DEVICE — INTENDED FOR TISSUE SEPARATION, AND OTHER PROCEDURES THAT REQUIRE A SHARP SURGICAL BLADE TO PUNCTURE OR CUT.: Brand: BARD-PARKER ® CARBON RIB-BACK BLADES

## (undated) DEVICE — Device

## (undated) DEVICE — ROCKER SWITCH PENCIL BLADE ELECTRODE, HOLSTER: Brand: EDGE

## (undated) DEVICE — PREP SKN CHLRAPRP APL 26ML STR --

## (undated) DEVICE — NEEDLE HYPO 18GA L1.5IN PNK S STL HUB POLYPR SHLD REG BVL

## (undated) DEVICE — SUTURE VCRL SZ 3-0 L27IN ABSRB UD L26MM SH 1/2 CIR J416H

## (undated) DEVICE — GEL US 20GM NONIRRITATING OVERWRAPPED FILE PCH TRNSMIT

## (undated) DEVICE — DBD-PACK,LAPAROTOMY,2 REINFORCED GOWNS: Brand: MEDLINE

## (undated) DEVICE — SOLUTION SCRB 2OZ 10% POVIDONE IOD ANTIMIC BTL

## (undated) DEVICE — MAGNETIC INSTR DRAPE 20X16: Brand: MEDLINE INDUSTRIES, INC.

## (undated) DEVICE — STERILE POLYISOPRENE POWDER-FREE SURGICAL GLOVES: Brand: PROTEXIS

## (undated) DEVICE — SUTURE ABSORBABLE BRAIDED 4-0 P-3 18 IN UD VICRYL J494G

## (undated) DEVICE — SUTURE MCRYL SZ 4-0 L27IN ABSRB UD L19MM PS-2 1/2 CIR PRIM Y426H

## (undated) DEVICE — SUTURE PLN GUT SZ 5-0 L18IN ABSRB YELLOWISH TAN L13MM PC-1 1915G

## (undated) DEVICE — PACK PROCEDURE SURG ENT CUST

## (undated) DEVICE — INFECTION CONTROL KIT SYS

## (undated) DEVICE — SPONGE LAP 18X18IN STRL -- 5/PK

## (undated) DEVICE — DERMABOND SKIN ADH 0.7ML -- DERMABOND ADVANCED 12/BX

## (undated) DEVICE — STAPLER SKIN SQ 30 ABSRB STPL DISP INSORB

## (undated) DEVICE — TRAY PREP DRY W/ PREM GLV 2 APPL 6 SPNG 2 UNDPD 1 OVERWRAP

## (undated) DEVICE — TUBING SUCT L9FT FOR AUTOFUSE INFLTR SYS

## (undated) DEVICE — SUTURE MCRYL SZ 3-0 L27IN ABSRB UD L24MM PS-1 3/8 CIR PRIM Y936H

## (undated) DEVICE — STRAP,POSITIONING,KNEE/BODY,FOAM,4X60": Brand: MEDLINE

## (undated) DEVICE — SUTURE PDS II SZ 0 L27IN ABSRB VLT L26MM CT-2 1/2 CIR Z334H

## (undated) DEVICE — FRAZIER SUCTION INSTRUMENT 7 FR W/CONTROL VENT & OBTURATOR: Brand: FRAZIER

## (undated) DEVICE — DECANTER BAG 9": Brand: MEDLINE INDUSTRIES, INC.

## (undated) DEVICE — DRAPE,REIN 53X77,STERILE: Brand: MEDLINE

## (undated) DEVICE — TOWEL SURG W17XL27IN STD BLU COT NONFENESTRATED PREWASHED

## (undated) DEVICE — DRAPE,CHEST,FENES,15X10,STERIL: Brand: MEDLINE

## (undated) DEVICE — NEEDLE HYPO 25GA L1.5IN BLU POLYPR HUB S STL REG BVL STR

## (undated) DEVICE — MICRODISSECTION NEEDLE STRAIGHT SLEEVE: Brand: COLORADO

## (undated) DEVICE — SPONGE GZ W4XL4IN COT RADPQ HIGHLY ABSRB

## (undated) DEVICE — SOLUTION IV 1000ML 0.9% SOD CHL

## (undated) DEVICE — GOWN,SIRUS,NONRNF,SETINSLV,XL,20/CS: Brand: MEDLINE

## (undated) DEVICE — COVER,MAYO STAND,STERILE: Brand: MEDLINE

## (undated) DEVICE — SYR 3ML LL TIP 1/10ML GRAD --

## (undated) DEVICE — SUTURE PDS II SZ 2-0 L27IN ABSRB VLT CP-1 L36MM 1/2 CIR REV Z466H

## (undated) DEVICE — TUBING, SUCTION, 1/4" X 10', STRAIGHT: Brand: MEDLINE

## (undated) DEVICE — SUT PROL 5-0 18IN PS2 BLU --

## (undated) DEVICE — 450 ML BOTTLE OF 0.05% CHLORHEXIDINE GLUCONATE IN 99.95% STERILE WATER FOR IRRIGATION, USP AND APPLICATOR.: Brand: IRRISEPT ANTIMICROBIAL WOUND LAVAGE

## (undated) DEVICE — SUTURE VCRL SZ 3-0 L18IN ABSRB UD PS-2 L19MM 3/8 CRV PRIM J497H

## (undated) DEVICE — NEEDLE HYPO 22GA L1.5IN BLK POLYPR HUB S STL REG BVL STR

## (undated) DEVICE — REM POLYHESIVE ADULT PATIENT RETURN ELECTRODE: Brand: VALLEYLAB

## (undated) DEVICE — CANISTER, RIGID, 3000CC: Brand: MEDLINE INDUSTRIES, INC.

## (undated) DEVICE — SYR 50ML LR LCK 1ML GRAD NSAF --

## (undated) DEVICE — KIT TISS EXP W/ 60ML SYR 122CM TRNSF SET PIERCING DEV L25CM

## (undated) DEVICE — SPONGE GZ W4XL4IN COT 12 PLY TYP VII WVN C FLD DSGN

## (undated) DEVICE — COVER,TABLE,60X90,STERILE: Brand: MEDLINE

## (undated) DEVICE — PAD,ABDOMINAL,5"X9",ST,LF,25/BX: Brand: MEDLINE INDUSTRIES, INC.

## (undated) DEVICE — BANDAGE,GAUZE,BULKEE II,4.5"X4.1YD,STRL: Brand: MEDLINE

## (undated) DEVICE — 3M™ TEGADERM™ TRANSPARENT FILM DRESSING FRAME STYLE, 1628, 6 IN X 8 IN (15 CM X 20 CM), 10/CT 8CT/CASE: Brand: 3M™ TEGADERM™

## (undated) DEVICE — SYR 10ML LUER LOK 1/5ML GRAD --

## (undated) DEVICE — BRA SURG POST-OP XL 46IN --

## (undated) DEVICE — HANDLE LT SNAP ON ULT DURABLE LENS FOR TRUMPF ALC DISPOSABLE